# Patient Record
Sex: FEMALE | Race: ASIAN
[De-identification: names, ages, dates, MRNs, and addresses within clinical notes are randomized per-mention and may not be internally consistent; named-entity substitution may affect disease eponyms.]

---

## 2021-12-25 ENCOUNTER — NON-APPOINTMENT (OUTPATIENT)
Age: 31
End: 2021-12-25

## 2021-12-27 ENCOUNTER — APPOINTMENT (OUTPATIENT)
Dept: OBGYN | Facility: CLINIC | Age: 31
End: 2021-12-27

## 2021-12-28 ENCOUNTER — TRANSCRIPTION ENCOUNTER (OUTPATIENT)
Age: 31
End: 2021-12-28

## 2021-12-28 ENCOUNTER — APPOINTMENT (OUTPATIENT)
Dept: OBGYN | Facility: CLINIC | Age: 31
End: 2021-12-28
Payer: MEDICAID

## 2021-12-28 VITALS
HEIGHT: 62 IN | SYSTOLIC BLOOD PRESSURE: 108 MMHG | OXYGEN SATURATION: 97 % | BODY MASS INDEX: 20.61 KG/M2 | WEIGHT: 112 LBS | TEMPERATURE: 96.6 F | DIASTOLIC BLOOD PRESSURE: 74 MMHG | HEART RATE: 84 BPM | RESPIRATION RATE: 12 BRPM

## 2021-12-28 DIAGNOSIS — Z78.9 OTHER SPECIFIED HEALTH STATUS: ICD-10-CM

## 2021-12-28 DIAGNOSIS — Z34.91 ENCOUNTER FOR SUPERVISION OF NORMAL PREGNANCY, UNSPECIFIED, FIRST TRIMESTER: ICD-10-CM

## 2021-12-28 PROBLEM — Z00.00 ENCOUNTER FOR PREVENTIVE HEALTH EXAMINATION: Status: ACTIVE | Noted: 2021-12-28

## 2021-12-28 PROCEDURE — 99204 OFFICE O/P NEW MOD 45 MIN: CPT | Mod: 25

## 2021-12-28 PROCEDURE — 81002 URINALYSIS NONAUTO W/O SCOPE: CPT

## 2021-12-28 PROCEDURE — 36415 COLL VENOUS BLD VENIPUNCTURE: CPT

## 2021-12-28 NOTE — HISTORY OF PRESENT ILLNESS
[FreeTextEntry1] : 30yo  LMP 11/5/21 here for positive pregnancy test.\par menses are regular. no issues or complaints.\par \par lives with  (Ciro) and daughter (Karlee).\par \par CS x1 2017 @ 38wks for breech presentation, c/b GDMA1, PUPPS. delivered in [place]

## 2021-12-28 NOTE — DISCUSSION/SUMMARY
[FreeTextEntry1] : discussed ddx of early IUP vs. abnormal IUP vs. ectopic pregnancy vs. molar pregnancy. ruptured ectopic precautions provided\par [] hcg\par [] T&S\par RTC in 1wk for repeat US and further planning\par Rina CARPIO

## 2021-12-28 NOTE — PROCEDURE
[Transabdominal OB Sonogram] : Transabdominal OB Sonogram [Intrauterine Pregnancy] : no intrauterine pregnancy [Yolk Sac] : no yolk sac [Fetal Heart] : no fetal heart [FreeTextEntry1] : EML 2.35cm no gestational sac seen. no adnexal masses bilaterally. no free fluid.

## 2021-12-30 LAB
ABO + RH PNL BLD: NORMAL
BLD GP AB SCN SERPL QL: NORMAL
HBV SURFACE AG SER QL: NONREACTIVE
HCG SERPL-MCNC: 2571 MIU/ML
HCV AB SER QL: NONREACTIVE
HCV S/CO RATIO: 0.14 S/CO
HIV1+2 AB SPEC QL IA.RAPID: NONREACTIVE
MEV IGG FLD QL IA: 9.4 AU/ML
MEV IGG+IGM SER-IMP: NEGATIVE
RUBV IGG FLD-ACNC: 1.4 INDEX
RUBV IGG SER-IMP: POSITIVE
T PALLIDUM AB SER QL IA: NEGATIVE
VZV AB TITR SER: POSITIVE
VZV IGG SER IF-ACNC: 2059 INDEX

## 2022-01-07 ENCOUNTER — APPOINTMENT (OUTPATIENT)
Dept: OBGYN | Facility: CLINIC | Age: 32
End: 2022-01-07
Payer: MEDICAID

## 2022-01-07 VITALS
OXYGEN SATURATION: 98 % | DIASTOLIC BLOOD PRESSURE: 64 MMHG | BODY MASS INDEX: 20.98 KG/M2 | RESPIRATION RATE: 12 BRPM | TEMPERATURE: 97.7 F | HEIGHT: 62 IN | SYSTOLIC BLOOD PRESSURE: 94 MMHG | HEART RATE: 92 BPM | WEIGHT: 114 LBS

## 2022-01-07 DIAGNOSIS — Z32.01 ENCOUNTER FOR PREGNANCY TEST, RESULT POSITIVE: ICD-10-CM

## 2022-01-07 LAB — BACTERIA UR CULT: ABNORMAL

## 2022-01-07 PROCEDURE — 99214 OFFICE O/P EST MOD 30 MIN: CPT

## 2022-01-07 RX ORDER — AMPICILLIN 500 MG/1
500 CAPSULE ORAL 4 TIMES DAILY
Qty: 28 | Refills: 0 | Status: ACTIVE | COMMUNITY
Start: 2022-01-07 | End: 1900-01-01

## 2022-01-07 NOTE — HISTORY OF PRESENT ILLNESS
[FreeTextEntry1] : patient here for follow up of positive pregnancy test.\par no complaints.\par no vb, no abdominal pain. \par \par reports was going over menstrual history and thinks she probably had menses 11/30/21 hence making it her LMP.

## 2022-01-07 NOTE — PROCEDURE
[Transabdominal OB Sonogram] : Transabdominal OB Sonogram [Intrauterine Pregnancy] : intrauterine pregnancy [Yolk Sac] : yolk sac present [Fetal Heart] : no fetal heart [Transabdominal OB Sonogram WNL] : Transabdominal OB Sonogram WNL [FreeTextEntry1] : +GS, +YS. no fetal pole. no adnexal masses no free fluid.

## 2022-01-07 NOTE — DISCUSSION/SUMMARY
[FreeTextEntry1] : IUP.\par based on LMP 11/30/21 appropriate findings. pt to return in 1wk for repeat US and first prenatal visit. \par Rina CARPIO

## 2022-01-14 ENCOUNTER — APPOINTMENT (OUTPATIENT)
Dept: OBGYN | Facility: CLINIC | Age: 32
End: 2022-01-14
Payer: MEDICAID

## 2022-01-14 VITALS
OXYGEN SATURATION: 98 % | SYSTOLIC BLOOD PRESSURE: 113 MMHG | RESPIRATION RATE: 12 BRPM | DIASTOLIC BLOOD PRESSURE: 76 MMHG | HEIGHT: 62 IN | TEMPERATURE: 96.8 F | BODY MASS INDEX: 20.43 KG/M2 | HEART RATE: 96 BPM | WEIGHT: 111 LBS

## 2022-01-14 PROCEDURE — 76857 US EXAM PELVIC LIMITED: CPT

## 2022-01-14 PROCEDURE — 99213 OFFICE O/P EST LOW 20 MIN: CPT | Mod: 25

## 2022-01-14 NOTE — HISTORY OF PRESENT ILLNESS
[FreeTextEntry1] : pt here for follow up.\par starting to have early pregnancy symptoms.\par no pain. no vb.

## 2022-01-14 NOTE — PROCEDURE
[Transabdominal OB Sonogram] : Transabdominal OB Sonogram [Intrauterine Pregnancy] : intrauterine pregnancy [Yolk Sac] : yolk sac present [Fetal Heart] : no fetal heart [FreeTextEntry1] : GS and YS larger than last week but no distinct FP or FH seen. no adnexal masses.

## 2022-01-14 NOTE — DISCUSSION/SUMMARY
[FreeTextEntry1] : [] pelvic US referral provided\par RTC in 1wk for result review and repeat US\par Rina CARPIO

## 2022-01-27 ENCOUNTER — APPOINTMENT (OUTPATIENT)
Dept: OBGYN | Facility: CLINIC | Age: 32
End: 2022-01-27
Payer: MEDICAID

## 2022-01-27 VITALS
HEART RATE: 68 BPM | TEMPERATURE: 97.7 F | DIASTOLIC BLOOD PRESSURE: 70 MMHG | OXYGEN SATURATION: 98 % | SYSTOLIC BLOOD PRESSURE: 109 MMHG | WEIGHT: 114 LBS | RESPIRATION RATE: 12 BRPM | HEIGHT: 62 IN | BODY MASS INDEX: 20.98 KG/M2

## 2022-01-27 DIAGNOSIS — Z34.90 ENCOUNTER FOR SUPERVISION OF NORMAL PREGNANCY, UNSPECIFIED, UNSPECIFIED TRIMESTER: ICD-10-CM

## 2022-01-27 DIAGNOSIS — O23.40 UNSPECIFIED INFECTION OF URINARY TRACT IN PREGNANCY, UNSPECIFIED TRIMESTER: ICD-10-CM

## 2022-01-27 PROCEDURE — 99214 OFFICE O/P EST MOD 30 MIN: CPT | Mod: 25

## 2022-01-27 PROCEDURE — 76856 US EXAM PELVIC COMPLETE: CPT

## 2022-01-27 NOTE — HISTORY OF PRESENT ILLNESS
[FreeTextEntry1] : patient here for evaluation of early pregnancy.\par last week ultrasound showed IUP w/ CRL measuring 6wks but no FH. subchorionic hematoma.\par no vb, no cramping or pain. \par

## 2022-01-27 NOTE — DISCUSSION/SUMMARY
[FreeTextEntry1] : dx of MAB discussed.\par r/b of 1) expectant 2) medical 3) surgical management discussed. \par pt would like to wait 1 more week to confirm dx for now.\par SAB precautions discussed\par RTC in 1wk\par Rina CARPIO

## 2022-01-27 NOTE — PROCEDURE
[Transabdominal OB Sonogram] : Transabdominal OB Sonogram [Intrauterine Pregnancy] : intrauterine pregnancy [Yolk Sac] : yolk sac present [Fetal Heart] : no fetal heart [CRL: ___ (mm)] : CRL - [unfilled]Umm [FreeTextEntry1] : CRL measuring 6wks but no FH. subchorionic hematoma seen.

## 2022-02-04 ENCOUNTER — APPOINTMENT (OUTPATIENT)
Dept: OBGYN | Facility: CLINIC | Age: 32
End: 2022-02-04
Payer: MEDICAID

## 2022-02-04 VITALS
OXYGEN SATURATION: 99 % | BODY MASS INDEX: 20.61 KG/M2 | WEIGHT: 112 LBS | RESPIRATION RATE: 13 BRPM | TEMPERATURE: 97.7 F | HEART RATE: 78 BPM | HEIGHT: 62 IN | DIASTOLIC BLOOD PRESSURE: 58 MMHG | SYSTOLIC BLOOD PRESSURE: 99 MMHG

## 2022-02-04 PROCEDURE — 99214 OFFICE O/P EST MOD 30 MIN: CPT

## 2022-02-04 RX ORDER — IBUPROFEN 800 MG/1
800 TABLET, FILM COATED ORAL 3 TIMES DAILY
Qty: 60 | Refills: 0 | Status: ACTIVE | COMMUNITY
Start: 2022-02-04 | End: 1900-01-01

## 2022-02-04 RX ORDER — MISOPROSTOL 200 UG/1
200 TABLET ORAL
Qty: 8 | Refills: 0 | Status: ACTIVE | COMMUNITY
Start: 2022-02-04 | End: 1900-01-01

## 2022-02-04 NOTE — DISCUSSION/SUMMARY
[FreeTextEntry1] : r/b/a of management options for MAB discussed including 1)expectant 2)medical 3) surgical management. through shared decision making we decided to proceed w/ medical management. cytotec 800mcg. instructions explained in detail w/ patient verbalizing understanding of plan. written instructions provided as well. pt to f/up after treatment. \par Rina CARPIO

## 2022-02-04 NOTE — PROCEDURE
[Transabdominal OB Sonogram] : Transabdominal OB Sonogram [Intrauterine Pregnancy] : intrauterine pregnancy [Yolk Sac] : yolk sac present [Fetal Heart] : no fetal heart [FreeTextEntry1] : IUP. no FH. same finding as last week, finding consistent w/ MAB.

## 2022-02-25 ENCOUNTER — APPOINTMENT (OUTPATIENT)
Dept: OBGYN | Facility: CLINIC | Age: 32
End: 2022-02-25
Payer: MEDICAID

## 2022-02-25 VITALS
TEMPERATURE: 97.5 F | DIASTOLIC BLOOD PRESSURE: 58 MMHG | RESPIRATION RATE: 12 BRPM | WEIGHT: 111 LBS | OXYGEN SATURATION: 98 % | HEIGHT: 62 IN | BODY MASS INDEX: 20.43 KG/M2 | HEART RATE: 68 BPM | SYSTOLIC BLOOD PRESSURE: 93 MMHG

## 2022-02-25 DIAGNOSIS — O03.9 COMPLETE OR UNSPECIFIED SPONTANEOUS ABORTION W/OUT COMPLICATION: ICD-10-CM

## 2022-02-25 DIAGNOSIS — O02.1 MISSED ABORTION: ICD-10-CM

## 2022-02-25 PROCEDURE — 76857 US EXAM PELVIC LIMITED: CPT

## 2022-02-25 PROCEDURE — 99214 OFFICE O/P EST MOD 30 MIN: CPT

## 2022-02-25 NOTE — PROCEDURE
[Anteverted] : anteverted [FreeTextEntry9] : follow up after SAB [FreeTextEntry4] : previously seen gestational sac no longer seen. EML 8mm. no doppler flow. findings consistent with complete AB.

## 2022-02-25 NOTE — HISTORY OF PRESENT ILLNESS
[FreeTextEntry1] : patient here for follow up after expectant management of MAB.\par reports day after last visit she started spontaneously miscarrying. \par currently has very light spotting. no pain.